# Patient Record
Sex: FEMALE | Race: WHITE | NOT HISPANIC OR LATINO | URBAN - METROPOLITAN AREA
[De-identification: names, ages, dates, MRNs, and addresses within clinical notes are randomized per-mention and may not be internally consistent; named-entity substitution may affect disease eponyms.]

---
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
## 2019-01-03 ENCOUNTER — APPOINTMENT (OUTPATIENT)
Dept: OBGYN | Facility: CLINIC | Age: 19
End: 2019-01-03

## 2019-01-16 ENCOUNTER — APPOINTMENT (OUTPATIENT)
Dept: OBGYN | Facility: CLINIC | Age: 19
End: 2019-01-16
Payer: COMMERCIAL

## 2019-01-16 VITALS — WEIGHT: 125 LBS | SYSTOLIC BLOOD PRESSURE: 100 MMHG | DIASTOLIC BLOOD PRESSURE: 60 MMHG

## 2019-01-16 LAB
BILIRUB UR QL STRIP: NORMAL
GLUCOSE UR-MCNC: NORMAL
HCG UR QL: 0.2 EU/DL
HGB UR QL STRIP.AUTO: NORMAL
KETONES UR-MCNC: NORMAL
LEUKOCYTE ESTERASE UR QL STRIP: NORMAL
NITRITE UR QL STRIP: NORMAL
PH UR STRIP: 8.5
PROT UR STRIP-MCNC: NORMAL
SP GR UR STRIP: 1.01

## 2019-01-16 PROCEDURE — 99213 OFFICE O/P EST LOW 20 MIN: CPT

## 2019-01-16 PROCEDURE — 81003 URINALYSIS AUTO W/O SCOPE: CPT | Mod: QW

## 2019-01-16 NOTE — PHYSICAL EXAM
[Awake] : awake [Alert] : alert [Soft] : soft [Oriented x3] : oriented to person, place, and time [Acute Distress] : no acute distress [Tender] : non tender [Normal] : uterus [Motion Tenderness] : there was no cervical motion tenderness [Anteversion] : anteverted [Uterine Adnexae] : were not tender and not enlarged

## 2019-04-24 ENCOUNTER — APPOINTMENT (OUTPATIENT)
Dept: OBGYN | Facility: CLINIC | Age: 19
End: 2019-04-24
Payer: COMMERCIAL

## 2019-04-24 VITALS — DIASTOLIC BLOOD PRESSURE: 60 MMHG | SYSTOLIC BLOOD PRESSURE: 100 MMHG | WEIGHT: 125 LBS

## 2019-04-24 LAB
BILIRUB UR QL STRIP: NORMAL
GLUCOSE UR-MCNC: NORMAL
HCG UR QL: NORMAL EU/DL
HGB UR QL STRIP.AUTO: NORMAL
KETONES UR-MCNC: NORMAL
LEUKOCYTE ESTERASE UR QL STRIP: NORMAL
NITRITE UR QL STRIP: NORMAL
PH UR STRIP: 5
PROT UR STRIP-MCNC: NORMAL
SP GR UR STRIP: 1.02

## 2019-04-24 PROCEDURE — 99212 OFFICE O/P EST SF 10 MIN: CPT

## 2019-04-24 PROCEDURE — 81003 URINALYSIS AUTO W/O SCOPE: CPT | Mod: QW

## 2019-06-07 ENCOUNTER — RX RENEWAL (OUTPATIENT)
Age: 19
End: 2019-06-07

## 2019-06-12 ENCOUNTER — RX RENEWAL (OUTPATIENT)
Age: 19
End: 2019-06-12

## 2019-08-05 ENCOUNTER — OUTPATIENT (OUTPATIENT)
Dept: OUTPATIENT SERVICES | Facility: HOSPITAL | Age: 19
LOS: 1 days | Discharge: HOME | End: 2019-08-05

## 2019-08-05 DIAGNOSIS — R82.90 UNSPECIFIED ABNORMAL FINDINGS IN URINE: ICD-10-CM

## 2019-08-05 DIAGNOSIS — R82.79 OTHER ABNORMAL FINDINGS ON MICROBIOLOGICAL EXAMINATION OF URINE: ICD-10-CM

## 2019-08-12 ENCOUNTER — RX RENEWAL (OUTPATIENT)
Age: 19
End: 2019-08-12

## 2019-08-19 ENCOUNTER — APPOINTMENT (OUTPATIENT)
Dept: OBGYN | Facility: CLINIC | Age: 19
End: 2019-08-19

## 2019-08-20 ENCOUNTER — APPOINTMENT (OUTPATIENT)
Dept: OBGYN | Facility: CLINIC | Age: 19
End: 2019-08-20

## 2019-09-03 ENCOUNTER — RX RENEWAL (OUTPATIENT)
Age: 19
End: 2019-09-03

## 2020-01-10 ENCOUNTER — APPOINTMENT (OUTPATIENT)
Dept: OBGYN | Facility: CLINIC | Age: 20
End: 2020-01-10
Payer: COMMERCIAL

## 2020-01-10 VITALS — WEIGHT: 126 LBS | DIASTOLIC BLOOD PRESSURE: 80 MMHG | SYSTOLIC BLOOD PRESSURE: 120 MMHG

## 2020-01-10 PROCEDURE — 99395 PREV VISIT EST AGE 18-39: CPT

## 2020-01-10 RX ORDER — FLUCONAZOLE 150 MG/1
150 TABLET ORAL
Qty: 2 | Refills: 0 | Status: COMPLETED | COMMUNITY
Start: 2018-11-15 | End: 2020-01-10

## 2020-01-10 RX ORDER — CLINDAMYCIN PHOSPHATE 20 MG/G
2 CREAM VAGINAL
Qty: 1 | Refills: 0 | Status: COMPLETED | COMMUNITY
Start: 2018-10-05 | End: 2020-01-10

## 2020-01-10 RX ORDER — NORETHINDRONE ACETATE AND ETHINYL ESTRADIOL AND FERROUS FUMARATE 1MG-20(21)
1-20 KIT ORAL
Qty: 84 | Refills: 0 | Status: COMPLETED | COMMUNITY
Start: 2019-06-07 | End: 2020-01-10

## 2020-01-10 RX ORDER — PHENAZOPYRIDINE 200 MG/1
200 TABLET, FILM COATED ORAL 3 TIMES DAILY
Qty: 30 | Refills: 1 | Status: COMPLETED | COMMUNITY
Start: 2019-01-16 | End: 2020-01-10

## 2020-01-10 RX ORDER — TERCONAZOLE 8 MG/G
0.8 CREAM VAGINAL
Qty: 3 | Refills: 0 | Status: COMPLETED | COMMUNITY
Start: 2018-06-05 | End: 2020-01-10

## 2020-01-10 RX ORDER — FLUCONAZOLE 150 MG/1
150 TABLET ORAL
Qty: 2 | Refills: 0 | Status: COMPLETED | COMMUNITY
Start: 2018-06-05 | End: 2020-01-10

## 2020-01-10 RX ORDER — CLOTRIMAZOLE AND BETAMETHASONE DIPROPIONATE 10; .5 MG/G; MG/G
1-0.05 CREAM TOPICAL TWICE DAILY
Qty: 1 | Refills: 1 | Status: COMPLETED | COMMUNITY
Start: 2018-10-05 | End: 2020-01-10

## 2020-01-10 RX ORDER — NORETHINDRONE ACETATE AND ETHINYL ESTRADIOL AND FERROUS FUMARATE 1MG-20(21)
1-20 KIT ORAL DAILY
Qty: 84 | Refills: 0 | Status: COMPLETED | COMMUNITY
Start: 2018-06-05 | End: 2020-01-10

## 2020-01-10 RX ORDER — METRONIDAZOLE 7.5 MG/G
0.75 GEL VAGINAL
Qty: 5 | Refills: 0 | Status: COMPLETED | COMMUNITY
Start: 2018-06-11 | End: 2020-01-10

## 2020-01-10 NOTE — PHYSICAL EXAM
[Awake] : awake [Alert] : alert [Acute Distress] : no acute distress [LAD] : no lymphadenopathy [Thyroid Nodule] : no thyroid nodule [Goiter] : no goiter [Mass] : no breast mass [Nipple Discharge] : no nipple discharge [Axillary LAD] : no axillary lymphadenopathy [Soft] : soft [Tender] : non tender [Distended] : not distended [Oriented x3] : oriented to person, place, and time [Depressed Mood] : not depressed [Normal] : uterus [Pap Obtained] : a Pap smear was not performed [Uterine Adnexae] : were not tender and not enlarged [CTAB] : CTAB [RRR, No Murmurs] : RRR, no murmurs

## 2020-01-10 NOTE — COUNSELING
[Breast Self Exam] : breast self exam [Exercise] : exercise [Vitamins/Supplements] : vitamins/supplements [Safe Sexual Practices] : safe sexual practices [Contraception] : contraception [Vulvar Hygiene] : vulvar hygiene

## 2020-03-12 ENCOUNTER — APPOINTMENT (OUTPATIENT)
Dept: OBGYN | Facility: CLINIC | Age: 20
End: 2020-03-12
Payer: COMMERCIAL

## 2020-03-12 VITALS — BODY MASS INDEX: 23.55 KG/M2 | HEIGHT: 62 IN | WEIGHT: 128 LBS

## 2020-03-12 DIAGNOSIS — Z87.42 PERSONAL HISTORY OF OTHER DISEASES OF THE FEMALE GENITAL TRACT: ICD-10-CM

## 2020-03-12 PROCEDURE — 99213 OFFICE O/P EST LOW 20 MIN: CPT

## 2020-03-12 PROCEDURE — 87075 CULTR BACTERIA EXCEPT BLOOD: CPT

## 2020-03-12 NOTE — PHYSICAL EXAM
[Awake] : awake [Alert] : alert [Acute Distress] : no acute distress [Soft] : soft [Tender] : non tender [Oriented x3] : oriented to person, place, and time [Depressed Mood] : not depressed [Normal] : vagina

## 2020-07-25 ENCOUNTER — RX RENEWAL (OUTPATIENT)
Age: 20
End: 2020-07-25

## 2020-11-24 ENCOUNTER — RX RENEWAL (OUTPATIENT)
Age: 20
End: 2020-11-24

## 2020-12-23 PROBLEM — Z87.42 HISTORY OF VULVOVAGINITIS: Status: RESOLVED | Noted: 2018-06-05 | Resolved: 2020-12-23

## 2020-12-28 ENCOUNTER — RX RENEWAL (OUTPATIENT)
Age: 20
End: 2020-12-28

## 2021-01-04 ENCOUNTER — APPOINTMENT (OUTPATIENT)
Dept: OBGYN | Facility: CLINIC | Age: 21
End: 2021-01-04
Payer: COMMERCIAL

## 2021-01-04 VITALS — DIASTOLIC BLOOD PRESSURE: 80 MMHG | WEIGHT: 138 LBS | SYSTOLIC BLOOD PRESSURE: 120 MMHG

## 2021-01-04 PROCEDURE — 99072 ADDL SUPL MATRL&STAF TM PHE: CPT

## 2021-01-04 PROCEDURE — 99395 PREV VISIT EST AGE 18-39: CPT

## 2021-01-04 RX ORDER — TERCONAZOLE 8 MG/G
0.8 CREAM VAGINAL
Qty: 1 | Refills: 1 | Status: DISCONTINUED | COMMUNITY
Start: 2020-03-12 | End: 2021-01-04

## 2021-01-04 RX ORDER — FLUCONAZOLE 150 MG/1
150 TABLET ORAL
Qty: 1 | Refills: 0 | Status: DISCONTINUED | COMMUNITY
Start: 2020-03-06 | End: 2021-01-04

## 2021-01-04 RX ORDER — CLOTRIMAZOLE AND BETAMETHASONE DIPROPIONATE 10; .5 MG/G; MG/G
1-0.05 CREAM TOPICAL 3 TIMES DAILY
Qty: 1 | Refills: 1 | Status: DISCONTINUED | COMMUNITY
Start: 2020-03-19 | End: 2021-01-04

## 2021-01-04 NOTE — HISTORY OF PRESENT ILLNESS
[N] : Patient denies prior pregnancies [TextBox_4] : 19yo who presents for annual GYN exam. Doing well.\par \par Menarche at age 9, regular, lasting 4-7 days. Heavy bleeding with cramping.\par \par Initiated OCPs about 2.5 years ago for menorrhagia. Cycles regular and now very light lasting 1-2 days. Some cramping.\par \par Sexually active, no current partner\par Last intercourse about 1 month ago\par Denies dyspareunia\par Contraception: OCPs, no condoms\par Denies history of STIs\par Denies vaginal discharge, irritation or odor \par \par History of ankle, wrist and shoulder fracture. Adequate dairy intake. No multivitamins or regular exercise. PCP manages BP, cholesterol, glucose and thyroid screening. \par

## 2021-01-06 LAB
C TRACH RRNA SPEC QL NAA+PROBE: NOT DETECTED
N GONORRHOEA RRNA SPEC QL NAA+PROBE: NOT DETECTED
SOURCE AMPLIFICATION: NORMAL

## 2021-01-08 LAB — CYTOLOGY CVX/VAG DOC THIN PREP: NORMAL

## 2021-02-23 ENCOUNTER — NON-APPOINTMENT (OUTPATIENT)
Age: 21
End: 2021-02-23

## 2021-03-16 ENCOUNTER — NON-APPOINTMENT (OUTPATIENT)
Age: 21
End: 2021-03-16

## 2021-04-10 LAB — BACTERIA UR CULT: NORMAL

## 2021-04-15 ENCOUNTER — APPOINTMENT (OUTPATIENT)
Dept: OBGYN | Facility: CLINIC | Age: 21
End: 2021-04-15
Payer: COMMERCIAL

## 2021-04-15 VITALS — WEIGHT: 145 LBS | DIASTOLIC BLOOD PRESSURE: 80 MMHG | SYSTOLIC BLOOD PRESSURE: 120 MMHG

## 2021-04-15 PROCEDURE — 99072 ADDL SUPL MATRL&STAF TM PHE: CPT

## 2021-04-15 PROCEDURE — 99213 OFFICE O/P EST LOW 20 MIN: CPT

## 2021-04-15 NOTE — PLAN
[FreeTextEntry1] : Advised to pt that she start using boric acid suppositories and decrease her sugar intake\par vaginitis panel done\par rx for antibiotic cream , anti fungal and steroid given with instructions\par will f/u with phone call in 2 wks,

## 2021-04-15 NOTE — PHYSICAL EXAM
[Labia Majora] : normal [Labia Minora] : normal [Discharge] : discharge [Moderate] : moderate [White] : white [Normal] : normal [Uterine Adnexae] : normal

## 2021-04-15 NOTE — HISTORY OF PRESENT ILLNESS
[FreeTextEntry1] : 19 y/o here c/o recurrent yeast and UTI, Urine culture last week was done and negative,  pt also was givne diflucan and stil she c/o of vaginal itching including her rectum.

## 2021-05-07 ENCOUNTER — NON-APPOINTMENT (OUTPATIENT)
Age: 21
End: 2021-05-07

## 2022-01-13 ENCOUNTER — APPOINTMENT (OUTPATIENT)
Dept: OBGYN | Facility: CLINIC | Age: 22
End: 2022-01-13
Payer: COMMERCIAL

## 2022-01-13 VITALS
DIASTOLIC BLOOD PRESSURE: 80 MMHG | SYSTOLIC BLOOD PRESSURE: 115 MMHG | BODY MASS INDEX: 28.52 KG/M2 | TEMPERATURE: 97.2 F | WEIGHT: 155 LBS | HEIGHT: 62 IN

## 2022-01-13 DIAGNOSIS — Z30.9 ENCOUNTER FOR CONTRACEPTIVE MANAGEMENT, UNSPECIFIED: ICD-10-CM

## 2022-01-13 PROCEDURE — 99395 PREV VISIT EST AGE 18-39: CPT

## 2022-01-13 PROCEDURE — 81003 URINALYSIS AUTO W/O SCOPE: CPT | Mod: QW

## 2022-01-13 RX ORDER — TERCONAZOLE 4 MG/G
0.4 CREAM VAGINAL
Qty: 1 | Refills: 0 | Status: COMPLETED | COMMUNITY
Start: 2021-04-08 | End: 2022-01-13

## 2022-01-13 RX ORDER — FLUCONAZOLE 150 MG/1
150 TABLET ORAL
Qty: 1 | Refills: 1 | Status: COMPLETED | COMMUNITY
Start: 2021-11-12 | End: 2022-01-13

## 2022-01-13 RX ORDER — FLUCONAZOLE 150 MG/1
150 TABLET ORAL
Qty: 2 | Refills: 0 | Status: COMPLETED | COMMUNITY
Start: 2021-01-11 | End: 2022-01-13

## 2022-01-13 RX ORDER — FLUCONAZOLE 150 MG/1
150 TABLET ORAL
Qty: 2 | Refills: 3 | Status: COMPLETED | COMMUNITY
Start: 2021-04-08 | End: 2022-01-13

## 2022-01-13 NOTE — HISTORY OF PRESENT ILLNESS
[Regular Cycle Intervals] : periods have been regular [Menarche Age: ____] : age at menarche was [unfilled] [Currently Active] : currently active [Men] : men [No] : No [FreeTextEntry1] : 12/?/21

## 2022-01-14 LAB
BILIRUB UR QL STRIP: NEGATIVE
GLUCOSE UR-MCNC: NEGATIVE
HCG UR QL: 0.2 EU/DL
HGB UR QL STRIP.AUTO: NORMAL
KETONES UR-MCNC: NEGATIVE
LEUKOCYTE ESTERASE UR QL STRIP: NORMAL
NITRITE UR QL STRIP: NEGATIVE
PH UR STRIP: 5.5
PROT UR STRIP-MCNC: NEGATIVE
SP GR UR STRIP: 1.03

## 2022-01-17 LAB
C TRACH RRNA SPEC QL NAA+PROBE: NOT DETECTED
N GONORRHOEA RRNA SPEC QL NAA+PROBE: NOT DETECTED
SOURCE TP AMPLIFICATION: NORMAL

## 2022-01-20 LAB — CYTOLOGY CVX/VAG DOC THIN PREP: NORMAL

## 2022-07-11 ENCOUNTER — NON-APPOINTMENT (OUTPATIENT)
Age: 22
End: 2022-07-11

## 2022-08-18 ENCOUNTER — APPOINTMENT (OUTPATIENT)
Dept: OBGYN | Facility: CLINIC | Age: 22
End: 2022-08-18

## 2022-08-18 VITALS — BODY MASS INDEX: 27.6 KG/M2 | HEIGHT: 62 IN | WEIGHT: 150 LBS

## 2022-08-18 PROCEDURE — 81002 URINALYSIS NONAUTO W/O SCOPE: CPT

## 2022-08-18 PROCEDURE — 99212 OFFICE O/P EST SF 10 MIN: CPT

## 2022-08-18 NOTE — PLAN
[FreeTextEntry1] : 21 yo G0, for OCP refill, doing well.\par \par - OCP refill x6 months sent to pharmacy\par - return to office January for annual exam and OCP refill

## 2022-08-18 NOTE — HISTORY OF PRESENT ILLNESS
[FreeTextEntry1] : 23 yo G0 w/ LMP 8/11/22 here for OCP refill. Happy with this method of contraception, has been taking OCPs since 12yo. Has generally regular menses, intermittent heavier cycle but not bothersome. Not currently sexually active. No complaints.

## 2022-08-22 LAB
BILIRUB UR QL STRIP: NORMAL
CLARITY UR: CLEAR
COLLECTION METHOD: NORMAL
GLUCOSE UR-MCNC: NORMAL
HCG UR QL: 0.2 EU/DL
HGB UR QL STRIP.AUTO: NORMAL
KETONES UR-MCNC: NORMAL
LEUKOCYTE ESTERASE UR QL STRIP: NORMAL
NITRITE UR QL STRIP: NORMAL
PH UR STRIP: 5.5
PROT UR STRIP-MCNC: NORMAL
SP GR UR STRIP: 1.03

## 2022-08-26 ENCOUNTER — APPOINTMENT (OUTPATIENT)
Dept: NEUROLOGY | Facility: CLINIC | Age: 22
End: 2022-08-26

## 2022-08-26 VITALS
HEIGHT: 62 IN | BODY MASS INDEX: 27.6 KG/M2 | HEART RATE: 68 BPM | WEIGHT: 150 LBS | SYSTOLIC BLOOD PRESSURE: 115 MMHG | DIASTOLIC BLOOD PRESSURE: 74 MMHG

## 2022-08-26 DIAGNOSIS — G47.10 HYPERSOMNIA, UNSPECIFIED: ICD-10-CM

## 2022-08-26 PROCEDURE — 99204 OFFICE O/P NEW MOD 45 MIN: CPT

## 2022-08-26 NOTE — PHYSICAL EXAM

## 2022-08-26 NOTE — HISTORY OF PRESENT ILLNESS
[FreeTextEntry1] : It's a pleasure to see Ms. DAYSI BLUM In the office today. 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  She reports that she has been having headaches for the last 5 years almost on a daily basis but most times it is mild and she would not need any treatment for it however 2 or 3 times a month the headache will be severe and will be debilitating.  She recently had MRI of the brain which did not show any significant pathology.  She has only used anti-inflammatory as needed for the severe migraine headaches, but the effectiveness is low.  When asked about  her neck, she reports having torticollis as a child, and does have tension in her neck frequently.  She has not done therapy for her neck before.\par \par Of note patient reported a history of 3 episodes of unexplained syncope in public places  when she was young but never had any real workup then.  She has not had any syncopal episodes for 5 years, but frequently she does get dizzy / vertiginous which he stands up.  In addition she feels tired all the time, and this is since  she was a child. As long as she is moving she can stay awake however any chance that she gets to rest she finds herself falling asleep.  She reports that she can sleep as many hours that she wants but still wakes up tired.  She has had episodes of seen fingers and smokes in the transition between wakefulness to sleep, and has had episodes of sleep paralysis, and she wakes up between 2-4 times a night.  She never experience obvious weakness with emotional  arousal including laughing at a joke.\par \par \par

## 2022-08-26 NOTE — ASSESSMENT
[FreeTextEntry1] :  chronic migraine headaches /cervicogenic headache- since she had a MRI of the brain already, no further neuro imaging of the brain necessary.  I recommend a trial of magnesium for prophylaxis as well as sumatriptan for abortive therapy.  Since part of her headache is thought to be neck related, also recommend a trial of physical therapy as well.\par \par  hypersomnia- etiology unclear at this time sleep apnea as well as possible  narcolepsy are under consideration, but she wants to focus on her headaches 1st.  I advised her to avoid driving as well as doing any activity that can potentially be danger as well drowsy for safety reasons, and she reported  understanding

## 2022-08-29 RX ORDER — NORETHINDRONE ACETATE/ETHINYL ESTRADIOL AND FERROUS FUMARATE 1MG-20(21)
1-20 KIT ORAL DAILY
Qty: 3 | Refills: 3 | Status: ACTIVE | COMMUNITY
Start: 2022-01-03 | End: 1900-01-01

## 2022-09-08 ENCOUNTER — APPOINTMENT (OUTPATIENT)
Dept: NEUROLOGY | Facility: CLINIC | Age: 22
End: 2022-09-08

## 2022-09-13 ENCOUNTER — APPOINTMENT (OUTPATIENT)
Dept: NEUROLOGY | Facility: CLINIC | Age: 22
End: 2022-09-13

## 2022-09-20 ENCOUNTER — APPOINTMENT (OUTPATIENT)
Dept: NEUROLOGY | Facility: CLINIC | Age: 22
End: 2022-09-20

## 2022-09-20 PROCEDURE — 95816 EEG AWAKE AND DROWSY: CPT

## 2022-10-04 ENCOUNTER — APPOINTMENT (OUTPATIENT)
Dept: NEUROLOGY | Facility: CLINIC | Age: 22
End: 2022-10-04

## 2022-10-27 ENCOUNTER — APPOINTMENT (OUTPATIENT)
Dept: NEUROLOGY | Facility: CLINIC | Age: 22
End: 2022-10-27

## 2022-10-27 VITALS
HEIGHT: 62 IN | DIASTOLIC BLOOD PRESSURE: 73 MMHG | HEART RATE: 76 BPM | WEIGHT: 150 LBS | SYSTOLIC BLOOD PRESSURE: 120 MMHG | BODY MASS INDEX: 27.6 KG/M2

## 2022-10-27 VITALS — HEIGHT: 62 IN | BODY MASS INDEX: 27.6 KG/M2 | WEIGHT: 150 LBS

## 2022-10-27 DIAGNOSIS — G95.9 DISEASE OF SPINAL CORD, UNSPECIFIED: ICD-10-CM

## 2022-10-27 DIAGNOSIS — G43.109 MIGRAINE WITH AURA, NOT INTRACTABLE, W/OUT STATUS MIGRAINOSUS: ICD-10-CM

## 2022-10-27 PROCEDURE — 99213 OFFICE O/P EST LOW 20 MIN: CPT

## 2022-10-27 RX ORDER — SUMATRIPTAN 50 MG/1
50 TABLET, FILM COATED ORAL
Qty: 9 | Refills: 1 | Status: ACTIVE | COMMUNITY
Start: 2022-10-27 | End: 1900-01-01

## 2022-10-27 NOTE — REASON FOR VISIT
[Follow-Up: _____] : a [unfilled] follow-up visit [Initial Evaluation] : an initial evaluation [FreeTextEntry1] : migraines

## 2022-10-27 NOTE — ASSESSMENT
[FreeTextEntry1] : 22 year old female with chronic migraines and cervical pain. She will undergo PT for the next 6-8 weeks and f/u in 8 weeks for re evaluation.  With respect to her headaches we will trial Magnesium oxide 400 mg daily and imitrex 50 mg prn.  \par \par I personally reviewed with the PA, this patient's history and physical exam findings, as documented above. I have discussed the relevant areas of concern, having direct implications to the presenting problems and illnesses, and I have personally examined all pertinent and positive and negative findings, which impact on the prior neurological treatment. \par \par \par Roberta Weaver, MS, PA-C\par Hugo Pereira DO\par

## 2022-10-27 NOTE — PHYSICAL EXAM

## 2022-10-27 NOTE — HISTORY OF PRESENT ILLNESS
[FreeTextEntry1] : ORIGINAL PRESENTATION:  It's a pleasure to see Ms. DAYSI BLUM In the office today. 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  She reports that she has been having headaches for the last 5 years almost on a daily basis but most times it is mild and she would not need any treatment for it however 2 or 3 times a month the headache will be severe and will be debilitating.  She recently had MRI of the brain which did not show any significant pathology.  She has only used anti-inflammatory as needed for the severe migraine headaches, but the effectiveness is low.  When asked about  her neck, she reports having torticollis as a child, and does have tension in her neck frequently.  She has not done therapy for her neck before.\par \par Of note patient reported a history of 3 episodes of unexplained syncope in public places  when she was young but never had any real workup then.  She has not had any syncopal episodes for 5 years, but frequently she does get dizzy / vertiginous which he stands up.  In addition she feels tired all the time, and this is since  she was a child. As long as she is moving she can stay awake however any chance that she gets to rest she finds herself falling asleep.  She reports that she can sleep as many hours that she wants but still wakes up tired.  She has had episodes of seen fingers and smokes in the transition between wakefulness to sleep, and has had episodes of sleep paralysis, and she wakes up between 2-4 times a night.  She never experience obvious weakness with emotional  arousal including laughing at a joke.\par \par TODAY:  I had the pleasure of seeing Ms. Blum today in follow up.  Her previous history and physical findings have been reviewed.\par \par She is under our care for chronic migraines as well as cervical pain which she is receiving continuing active treatment for.  She underwent EEG for further evaluation which came back normal.  With respect to her headaches she states they occur 4 days a week some worse than others.  OTC medication is ineffective and she never received magnesium or imitrex that was discussed at her last encounter.  With respect to her cervical spine MRI was denied as she had not undergone PT for at least 6 weeks. PT was ordered, however, the patient expresses that she does not like to be massaged or touched and I advised they can do other things in therapy and she is agreeable to proceed.\par \par \par

## 2022-10-27 NOTE — REVIEW OF SYSTEMS
No outpatient medications have been marked as taking for the 3/1/21 encounter (Telephone) with Jayson Avelar MD.         Pharmacy: [facility]   80 Day Prescription Request for Bupropion HCL XL 150mg Tab    Prescription drug not listed on medication list [Migraine Headache] : migraine headaches [Joint Pain] : joint pain [Joint Stiffness] : joint stiffness [Negative] : Musculoskeletal

## 2022-12-01 ENCOUNTER — NON-APPOINTMENT (OUTPATIENT)
Age: 22
End: 2022-12-01

## 2022-12-01 RX ORDER — MUPIROCIN 20 MG/G
2 OINTMENT TOPICAL 3 TIMES DAILY
Qty: 1 | Refills: 0 | Status: COMPLETED | COMMUNITY
Start: 2021-04-15 | End: 2022-12-01

## 2022-12-01 RX ORDER — FLUCONAZOLE 150 MG/1
150 TABLET ORAL
Qty: 1 | Refills: 0 | Status: COMPLETED | COMMUNITY
Start: 2022-03-28 | End: 2022-12-01

## 2022-12-01 RX ORDER — FLUCONAZOLE 150 MG/1
150 TABLET ORAL
Qty: 2 | Refills: 1 | Status: ACTIVE | COMMUNITY
Start: 2022-12-01 | End: 1900-01-01

## 2022-12-01 RX ORDER — SUMATRIPTAN 100 MG/1
100 TABLET, FILM COATED ORAL
Qty: 9 | Refills: 5 | Status: COMPLETED | COMMUNITY
Start: 2022-08-26 | End: 2022-12-01

## 2022-12-01 RX ORDER — CLOTRIMAZOLE AND BETAMETHASONE DIPROPIONATE 10; .5 MG/G; MG/G
1-0.05 CREAM TOPICAL TWICE DAILY
Qty: 1 | Refills: 0 | Status: COMPLETED | COMMUNITY
Start: 2021-04-15 | End: 2022-12-01

## 2022-12-01 RX ORDER — NORETHINDRONE ACETATE/ETHINYL ESTRADIOL AND FERROUS FUMARATE 1MG-20(21)
1-20 KIT ORAL
Qty: 1 | Refills: 1 | Status: COMPLETED | COMMUNITY
Start: 2020-07-25 | End: 2022-12-01

## 2022-12-01 RX ORDER — NORETHINDRONE ACETATE AND ETHINYL ESTRADIOL AND FERROUS FUMARATE 1MG-20(21)
1-20 KIT ORAL
Qty: 1 | Refills: 0 | Status: COMPLETED | COMMUNITY
Start: 2019-09-03 | End: 2022-12-01

## 2022-12-01 RX ORDER — MAGNESIUM OXIDE 241.3 MG/1000MG
400 TABLET ORAL DAILY
Qty: 30 | Refills: 1 | Status: COMPLETED | COMMUNITY
Start: 2022-10-27 | End: 2022-12-01

## 2022-12-01 RX ORDER — FLUCONAZOLE 150 MG/1
150 TABLET ORAL
Qty: 2 | Refills: 0 | Status: COMPLETED | COMMUNITY
Start: 2022-03-25 | End: 2022-12-01

## 2022-12-01 RX ORDER — MAGNESIUM OXIDE 420 MG
420 (252 MG) TABLET ORAL
Qty: 30 | Refills: 5 | Status: COMPLETED | COMMUNITY
Start: 2022-08-26 | End: 2022-12-01

## 2022-12-06 DIAGNOSIS — R10.2 PELVIC AND PERINEAL PAIN: ICD-10-CM

## 2022-12-06 DIAGNOSIS — Z87.898 PERSONAL HISTORY OF OTHER SPECIFIED CONDITIONS: ICD-10-CM

## 2022-12-06 DIAGNOSIS — N64.4 MASTODYNIA: ICD-10-CM

## 2022-12-06 RX ORDER — TERCONAZOLE 8 MG/G
0.8 CREAM VAGINAL
Qty: 1 | Refills: 0 | Status: ACTIVE | COMMUNITY
Start: 2022-12-06 | End: 1900-01-01

## 2022-12-20 ENCOUNTER — APPOINTMENT (OUTPATIENT)
Dept: NEUROLOGY | Facility: CLINIC | Age: 22
End: 2022-12-20

## 2023-01-24 ENCOUNTER — NON-APPOINTMENT (OUTPATIENT)
Age: 23
End: 2023-01-24

## 2023-01-24 DIAGNOSIS — N39.0 URINARY TRACT INFECTION, SITE NOT SPECIFIED: ICD-10-CM

## 2023-01-24 RX ORDER — NITROFURANTOIN (MONOHYDRATE/MACROCRYSTALS) 25; 75 MG/1; MG/1
100 CAPSULE ORAL
Qty: 14 | Refills: 0 | Status: ACTIVE | COMMUNITY
Start: 2023-01-24 | End: 1900-01-01

## 2023-01-24 RX ORDER — FLUCONAZOLE 150 MG/1
150 TABLET ORAL
Qty: 2 | Refills: 1 | Status: ACTIVE | COMMUNITY
Start: 2023-01-24 | End: 1900-01-01

## 2023-06-30 ENCOUNTER — NON-APPOINTMENT (OUTPATIENT)
Age: 23
End: 2023-06-30

## 2023-06-30 RX ORDER — FLUCONAZOLE 150 MG/1
150 TABLET ORAL
Qty: 2 | Refills: 1 | Status: ACTIVE | COMMUNITY
Start: 2023-06-30 | End: 1900-01-01

## 2023-08-09 ENCOUNTER — RX RENEWAL (OUTPATIENT)
Age: 23
End: 2023-08-09

## 2023-09-12 RX ORDER — NITROFURANTOIN (MONOHYDRATE/MACROCRYSTALS) 25; 75 MG/1; MG/1
100 CAPSULE ORAL
Qty: 14 | Refills: 0 | Status: ACTIVE | COMMUNITY
Start: 2023-09-12 | End: 1900-01-01

## 2023-09-14 ENCOUNTER — RX RENEWAL (OUTPATIENT)
Age: 23
End: 2023-09-14

## 2023-10-05 ENCOUNTER — APPOINTMENT (OUTPATIENT)
Dept: OBGYN | Facility: CLINIC | Age: 23
End: 2023-10-05

## 2023-10-11 ENCOUNTER — RX RENEWAL (OUTPATIENT)
Age: 23
End: 2023-10-11

## 2023-10-19 ENCOUNTER — APPOINTMENT (OUTPATIENT)
Dept: OBGYN | Facility: CLINIC | Age: 23
End: 2023-10-19
Payer: COMMERCIAL

## 2023-10-19 VITALS — BODY MASS INDEX: 29.81 KG/M2 | HEIGHT: 62 IN | WEIGHT: 162 LBS

## 2023-10-19 DIAGNOSIS — Z01.419 ENCOUNTER FOR GYNECOLOGICAL EXAMINATION (GENERAL) (ROUTINE) W/OUT ABNORMAL FINDINGS: ICD-10-CM

## 2023-10-19 DIAGNOSIS — B37.31 ACUTE CANDIDIASIS OF VULVA AND VAGINA: ICD-10-CM

## 2023-10-19 PROCEDURE — 99395 PREV VISIT EST AGE 18-39: CPT

## 2023-10-20 LAB
HBV SURFACE AG SER QL: NONREACTIVE
HCV AB SER QL: NONREACTIVE
HCV S/CO RATIO: 0.06 S/CO
HIV1+2 AB SPEC QL IA.RAPID: NONREACTIVE
T PALLIDUM AB SER QL IA: NEGATIVE

## 2023-10-23 LAB — BACTERIA UR CULT: NORMAL

## 2023-10-24 LAB — CYTOLOGY CVX/VAG DOC THIN PREP: ABNORMAL

## 2023-11-02 ENCOUNTER — NON-APPOINTMENT (OUTPATIENT)
Age: 23
End: 2023-11-02

## 2024-01-23 RX ORDER — FLUCONAZOLE 150 MG/1
150 TABLET ORAL
Qty: 5 | Refills: 4 | Status: ACTIVE | COMMUNITY
Start: 2024-01-23 | End: 1900-01-01

## 2024-01-25 RX ORDER — FLUCONAZOLE 150 MG/1
150 TABLET ORAL
Qty: 27 | Refills: 0 | Status: ACTIVE | COMMUNITY
Start: 2023-10-19 | End: 1900-01-01

## 2024-03-07 ENCOUNTER — APPOINTMENT (OUTPATIENT)
Dept: OBGYN | Facility: CLINIC | Age: 24
End: 2024-03-07
Payer: COMMERCIAL

## 2024-03-07 VITALS — BODY MASS INDEX: 29.44 KG/M2 | HEIGHT: 62 IN | WEIGHT: 160 LBS

## 2024-03-07 DIAGNOSIS — N89.8 OTHER SPECIFIED NONINFLAMMATORY DISORDERS OF VAGINA: ICD-10-CM

## 2024-03-07 PROCEDURE — 99212 OFFICE O/P EST SF 10 MIN: CPT

## 2024-03-07 NOTE — HISTORY OF PRESENT ILLNESS
[FreeTextEntry1] : 24 yo G0 w/ LMP 2/19 here for vaginal discharge. Has had history of yeast infections in the past. Reports discharge is clear/yellow without odor or itching. Comes and goes throughout the month. Was attempting to do suppressive therapy with diflucan but was having issue with her insurance company so has since stopped. No urinary complaints.

## 2024-03-07 NOTE — PHYSICAL EXAM
[Labia Majora] : normal [Labia Minora] : normal [Normal] : normal [Uterine Adnexae] : normal [FreeTextEntry4] : normal physiologic discharge

## 2024-03-07 NOTE — DISCUSSION/SUMMARY
[FreeTextEntry1] : 24 yo G0, vaginal discharge  - f/up vaginitis - will treat if positive - return to office annual exam or PRN

## 2024-03-12 ENCOUNTER — NON-APPOINTMENT (OUTPATIENT)
Age: 24
End: 2024-03-12

## 2024-03-27 RX ORDER — FLUCONAZOLE 150 MG/1
150 TABLET ORAL
Qty: 2 | Refills: 2 | Status: ACTIVE | COMMUNITY
Start: 2024-03-27 | End: 1900-01-01

## 2024-04-01 RX ORDER — CLOTRIMAZOLE AND BETAMETHASONE DIPROPIONATE 10; .5 MG/G; MG/G
1-0.05 CREAM TOPICAL TWICE DAILY
Qty: 1 | Refills: 1 | Status: ACTIVE | COMMUNITY
Start: 2024-04-01 | End: 1900-01-01

## 2024-04-04 ENCOUNTER — APPOINTMENT (OUTPATIENT)
Dept: OBGYN | Facility: CLINIC | Age: 24
End: 2024-04-04

## 2024-04-04 RX ORDER — FLUCONAZOLE 150 MG/1
150 TABLET ORAL
Qty: 1 | Refills: 0 | Status: ACTIVE | COMMUNITY
Start: 2024-04-04 | End: 1900-01-01

## 2024-04-11 ENCOUNTER — APPOINTMENT (OUTPATIENT)
Dept: OBGYN | Facility: CLINIC | Age: 24
End: 2024-04-11
Payer: COMMERCIAL

## 2024-04-11 DIAGNOSIS — N89.8 OTHER SPECIFIED NONINFLAMMATORY DISORDERS OF VAGINA: ICD-10-CM

## 2024-04-11 LAB
BILIRUB UR QL STRIP: NORMAL
CLARITY UR: CLEAR
COLLECTION METHOD: NORMAL
GLUCOSE UR-MCNC: NORMAL
HCG UR QL: 0.2 EU/DL
HGB UR QL STRIP.AUTO: NORMAL
KETONES UR-MCNC: NORMAL
LEUKOCYTE ESTERASE UR QL STRIP: NORMAL
NITRITE UR QL STRIP: NORMAL
PH UR STRIP: 7
PROT UR STRIP-MCNC: NORMAL
SP GR UR STRIP: 1.02

## 2024-04-11 PROCEDURE — 81003 URINALYSIS AUTO W/O SCOPE: CPT | Mod: QW

## 2024-04-11 PROCEDURE — 99212 OFFICE O/P EST SF 10 MIN: CPT

## 2024-04-11 NOTE — HISTORY OF PRESENT ILLNESS
[FreeTextEntry1] : 22 yo G0 here for follow up after yeast infection. Reports symptoms are resolved at this time. No itching or odor. Does have generalized increased discharge which is clear and not bothersome. No urinary complaints. Completed PO diflucan course and lotrisone.

## 2024-04-11 NOTE — DISCUSSION/SUMMARY
[FreeTextEntry1] : 22 yo G0 with resolved yeast infection.  - f/up vaginitis - call if symptoms return - return to office 6 months annual exam or PRN

## 2024-04-15 ENCOUNTER — NON-APPOINTMENT (OUTPATIENT)
Age: 24
End: 2024-04-15

## 2024-06-20 RX ORDER — FLUCONAZOLE 150 MG/1
150 TABLET ORAL
Qty: 2 | Refills: 0 | Status: ACTIVE | COMMUNITY
Start: 2024-06-20

## 2025-02-19 DIAGNOSIS — N39.0 URINARY TRACT INFECTION, SITE NOT SPECIFIED: ICD-10-CM

## 2025-02-19 RX ORDER — NITROFURANTOIN (MONOHYDRATE/MACROCRYSTALS) 25; 75 MG/1; MG/1
100 CAPSULE ORAL
Qty: 14 | Refills: 0 | Status: ACTIVE | COMMUNITY
Start: 2025-02-19 | End: 1900-01-01

## 2025-03-18 RX ORDER — TERCONAZOLE 8 MG/G
0.8 CREAM VAGINAL
Qty: 1 | Refills: 1 | Status: ACTIVE | COMMUNITY
Start: 2025-03-18 | End: 1900-01-01

## 2025-03-20 ENCOUNTER — NON-APPOINTMENT (OUTPATIENT)
Age: 25
End: 2025-03-20

## 2025-03-20 ENCOUNTER — APPOINTMENT (OUTPATIENT)
Dept: OBGYN | Facility: CLINIC | Age: 25
End: 2025-03-20
Payer: COMMERCIAL

## 2025-03-20 VITALS — BODY MASS INDEX: 29.44 KG/M2 | WEIGHT: 160 LBS | HEIGHT: 62 IN

## 2025-03-20 DIAGNOSIS — Z01.419 ENCOUNTER FOR GYNECOLOGICAL EXAMINATION (GENERAL) (ROUTINE) W/OUT ABNORMAL FINDINGS: ICD-10-CM

## 2025-03-20 PROCEDURE — 99395 PREV VISIT EST AGE 18-39: CPT

## 2025-03-27 LAB — CYTOLOGY CVX/VAG DOC THIN PREP: NORMAL

## 2025-05-05 ENCOUNTER — NON-APPOINTMENT (OUTPATIENT)
Age: 25
End: 2025-05-05

## 2025-06-05 NOTE — PLAN
Date/time of call received from lab: 06/05/25 at 3:36 PM.    Lab test:  Ck    Lab value:  [id number]    Ordering provider name: Karen    Ordering provider department: ER family    Primary Care Provider: No Ref-Primary, Physician     Result managed by: Clinic Hours: Primary Care clinic RN staff. Was test ordered in Primary Care?: Yes, ordered in Primary Care Primary Care: route telephone encounter high priority to ordering provider and ordering provider's clinic Nurse pool     Les Lund RN  [facility] Triage [place]  June 5, 2025     
[FreeTextEntry1] : Pt feels well no complaints. rx for OCP refill given, with ACHES reviewed, \par pap/gc/chla done\par F/U with results and 1 year visit,